# Patient Record
(demographics unavailable — no encounter records)

---

## 2024-10-10 NOTE — HEALTH RISK ASSESSMENT
[Good] : ~his/her~  mood as  good [No] : In the past 12 months have you used drugs other than those required for medical reasons? No [0] : 2) Feeling down, depressed, or hopeless: Not at all (0) [PHQ-2 Negative - No further assessment needed] : PHQ-2 Negative - No further assessment needed [Audit-CScore] : 0 [de-identified] : limited  [de-identified] : fruits limited [[id number]] : 0 [Former] : Former [10-14] : 10-14 [> 15 Years] : > 15 Years [Patient declined colonoscopy] : Patient declined colonoscopy [HIV test declined] : HIV test declined [Hepatitis C test declined] : Hepatitis C test declined [Change in mental status noted] : No change in mental status noted [Language] : denies difficulty with language [None] : None [With Significant Other] : lives with significant other [] :  [Feels Safe at Home] : Feels safe at home [Fully functional (bathing, dressing, toileting, transferring, walking, feeding)] : Fully functional (bathing, dressing, toileting, transferring, walking, feeding) [Fully functional (using the telephone, shopping, preparing meals, housekeeping, doing laundry, using] : Fully functional and needs no help or supervision to perform IADLs (using the telephone, shopping, preparing meals, housekeeping, doing laundry, using transportation, managing medications and managing finances) [Reports changes in hearing] : Reports no changes in hearing

## 2024-10-15 NOTE — PHYSICAL EXAM
[TextEntry] :  PLEASE SEE HPI FOR ADDITIONAL RELEVANT PHYSICAL EXAM FINDINGS GENERAL: no acute distress, nontoxic HEAD: normocephalic EYES: no scleral icterus NECK: trachea midline, Full ROM RESP: no respiratory distress ABD: nondistended MSK: no gross deformity NEURO: alert  SKIN: vesicular rash on right upper chest and upper back

## 2024-10-15 NOTE — PLAN
[FreeTextEntry1] :  Elderly m with liver ca on infusion therapy presents with shingles. I reviewed patients most recent labs which show a wbc count around 7.  He is afebrile, well appearing but c/o pain. He has oxycodone at home but did not use it.  I will prescribe valtrex and gabapentin and advise close f/u with his oncologist. Strict return precautions discussed

## 2024-10-15 NOTE — HISTORY OF PRESENT ILLNESS
[Home] : at home, [unfilled] , at the time of the visit. [Other Location: e.g. Home (Enter Location, City,State)___] : at [unfilled] [Verbal consent obtained from patient] : the patient, [unfilled] [FreeTextEntry8] :  Elderly m with liver ca on chemo present with right sided chest and upper back rash.  He states that it feels like someone is lighting a match on his chest.  He denies any trauma and calls in inquiring what to do.

## 2024-12-23 NOTE — REASON FOR VISIT
[Home] : at home, [unfilled] , at the time of the visit. [Medical Office: ([facility])___] : at the medical office located in  [Spouse] : spouse

## 2024-12-23 NOTE — REASON FOR VISIT
[Home] : at home, [unfilled] , at the time of the visit. [Medical Office: ([facility])___] : at the medical office located in  [Spouse] : spouse

## 2024-12-23 NOTE — REASON FOR VISIT
[Home] : at home, [unfilled] , at the time of the visit. [Medical Office: ([facility])___] : at the medical office located in  [Spouse] : spouse

## 2025-01-03 NOTE — HISTORY OF PRESENT ILLNESS
[de-identified] : Patient Name: CHRISTOPHER BURRELL  MRN: [id number]  Referring Provider: Dr. Bowen Date: 12/23/2024   Diagnosis: Liver mass  He presents for a follow up of a liver mass. 5/10/2019 CT chest: There are two 1.1 cm low-density liver lesions. They are probably cysts. 12/20/23-CT Chest: Since 5/10/2019, there is a new 5.4 cm liver mass, suspicious for malignancy. This could be a primary liver malignancy or metastasis. Recommend further evaluation with MRI of liver. 1/18/24- MR Abdomen: The hypoattenuating lesion noted on the recent chest CT is visualized as an approximate 4.2 x 5.7 x 4.0 cm lesion in the caudate lobe. The lesion is slightly hyperintense on T2-weighted images. It exhibits arterial phase enhancement with delayed phase washout. It exhibits restricted diffusion. It corresponds to a primary liver neoplasm, HCC. Based on the lesion size and its enhancement characteristics, this would be classified as a LiRAD 5 lesion, HCC. Smaller lesion adjacent to the above-described hepatic lesion is likely a complex cyst. 1/30/24- AFP: 55.6; Ca 19-9: 20; AST: 27; ALT: 31; ALK: 110 2/1/24- CTAP: 4.6 cm heterogeneous rounded mass in the posterior aspect of the left lobe of the liver suspicious for hepatocellular carcinoma. 1 cm cyst in the right lobe of liver anteriorly. 2/1/24 - CT AP showed a 4.6 cm mass in the posterior aspect of the left lobe of the liver suspicious for HCC. 3/13/24 - Y90 5/20/24 - MRI shows decreased tumor size, there is still viable residual tumor 8/21/24 - MRI shows persistent unchanged viable residual tumor at the posterior aspect of the caudate lobe lesion. Treated portions of the lesions have continued to decrease in size. No new suspicious lesions. 9/23/24 - Y90 12/12/24 - MRI shows post Y90 treatment of the lesion in segment one of liver. Lesion now shows improvement with central necrosis and decrease restricted diffusion. No central arterial hypervascularity. No other new lesions identified. 12/14/24 - AFP <1.0 (was 55.6 1/30/24)  Currently, Mr. BURRELL denies abdominal pain or discomfort, he is tolerating a regular diet and is drinking Ensure to supplement. He is having regular bowel movements.

## 2025-01-03 NOTE — ASSESSMENT
[FreeTextEntry1] : Impression) HCC status post Y90 treatment  Plan) reviewed current clinical information with the patient and his wife.  Has had an excellent response to the Y90 based on the fact that the tumor is smaller and the AFP has now normalized.  There is still seems to be areas of restricted diffusion but whether this is secondary to treatment or active disease I cannot tell at this time.  It still appears that the outflow is involved by the disease.  Will review at upcoming tumor board.  He has not received any systemic therapy and whether this is something that we should add to his treatment plan will be discussed.  Will follow-up after tumor board discussion.  All questions answered.  Masoud Perez MD  Chief Surgical Oncology Multidisciplinary GI cancer program [facility]  Professor Surgery [employer]   Time spent 25 minutes

## 2025-01-03 NOTE — ASSESSMENT
[FreeTextEntry1] : Impression) HCC status post Y90 treatment  Plan) reviewed current clinical information with the patient and his wife.  Has had an excellent response to the Y90 based on the fact that the tumor is smaller and the AFP has now normalized.  There is still seems to be areas of restricted diffusion but whether this is secondary to treatment or active disease I cannot tell at this time.  It still appears that the outflow is involved by the disease.  Will review at upcoming tumor board.  He has not received any systemic therapy and whether this is something that we should add to his treatment plan will be discussed.  Will follow-up after tumor board discussion.  All questions answered.  Masoud Perez MD  Chief Surgical Oncology Multidisciplinary GI cancer program [facility]  Professor Surgery [employer]   Time spent 25 minutes

## 2025-01-03 NOTE — ASSESSMENT
[FreeTextEntry1] : Impression) HCC status post Y90 treatment  Plan) reviewed current clinical information with the patient and his wife.  Has had an excellent response to the Y90 based on the fact that the tumor is smaller and the AFP has now normalized.  There is still seems to be areas of restricted diffusion but whether this is secondary to treatment or active disease I cannot tell at this time.  It still appears that the outflow is involved by the disease.  Will review at upcoming tumor board.  He has not received any systemic therapy and whether this is something that we should add to his treatment plan will be discussed.  Will follow-up after tumor board discussion.  All questions answered.  Masoud Perez MD  Chief Surgical Oncology Multidisciplinary GI cancer program [facility]  Professor Surgery [employer]   Time spent 25 minutes

## 2025-01-03 NOTE — HISTORY OF PRESENT ILLNESS
[de-identified] : Patient Name: CHRISTOPHER BURRELL  MRN: [id number]  Referring Provider: Dr. Bowen Date: 12/23/2024   Diagnosis: Liver mass  He presents for a follow up of a liver mass. 5/10/2019 CT chest: There are two 1.1 cm low-density liver lesions. They are probably cysts. 12/20/23-CT Chest: Since 5/10/2019, there is a new 5.4 cm liver mass, suspicious for malignancy. This could be a primary liver malignancy or metastasis. Recommend further evaluation with MRI of liver. 1/18/24- MR Abdomen: The hypoattenuating lesion noted on the recent chest CT is visualized as an approximate 4.2 x 5.7 x 4.0 cm lesion in the caudate lobe. The lesion is slightly hyperintense on T2-weighted images. It exhibits arterial phase enhancement with delayed phase washout. It exhibits restricted diffusion. It corresponds to a primary liver neoplasm, HCC. Based on the lesion size and its enhancement characteristics, this would be classified as a LiRAD 5 lesion, HCC. Smaller lesion adjacent to the above-described hepatic lesion is likely a complex cyst. 1/30/24- AFP: 55.6; Ca 19-9: 20; AST: 27; ALT: 31; ALK: 110 2/1/24- CTAP: 4.6 cm heterogeneous rounded mass in the posterior aspect of the left lobe of the liver suspicious for hepatocellular carcinoma. 1 cm cyst in the right lobe of liver anteriorly. 2/1/24 - CT AP showed a 4.6 cm mass in the posterior aspect of the left lobe of the liver suspicious for HCC. 3/13/24 - Y90 5/20/24 - MRI shows decreased tumor size, there is still viable residual tumor 8/21/24 - MRI shows persistent unchanged viable residual tumor at the posterior aspect of the caudate lobe lesion. Treated portions of the lesions have continued to decrease in size. No new suspicious lesions. 9/23/24 - Y90 12/12/24 - MRI shows post Y90 treatment of the lesion in segment one of liver. Lesion now shows improvement with central necrosis and decrease restricted diffusion. No central arterial hypervascularity. No other new lesions identified. 12/14/24 - AFP <1.0 (was 55.6 1/30/24)  Currently, Mr. BURRELL denies abdominal pain or discomfort, he is tolerating a regular diet and is drinking Ensure to supplement. He is having regular bowel movements.

## 2025-01-03 NOTE — HISTORY OF PRESENT ILLNESS
[de-identified] : Patient Name: CHRISTOPHER BURRELL  MRN: [id number]  Referring Provider: Dr. Bowen Date: 12/23/2024   Diagnosis: Liver mass  He presents for a follow up of a liver mass. 5/10/2019 CT chest: There are two 1.1 cm low-density liver lesions. They are probably cysts. 12/20/23-CT Chest: Since 5/10/2019, there is a new 5.4 cm liver mass, suspicious for malignancy. This could be a primary liver malignancy or metastasis. Recommend further evaluation with MRI of liver. 1/18/24- MR Abdomen: The hypoattenuating lesion noted on the recent chest CT is visualized as an approximate 4.2 x 5.7 x 4.0 cm lesion in the caudate lobe. The lesion is slightly hyperintense on T2-weighted images. It exhibits arterial phase enhancement with delayed phase washout. It exhibits restricted diffusion. It corresponds to a primary liver neoplasm, HCC. Based on the lesion size and its enhancement characteristics, this would be classified as a LiRAD 5 lesion, HCC. Smaller lesion adjacent to the above-described hepatic lesion is likely a complex cyst. 1/30/24- AFP: 55.6; Ca 19-9: 20; AST: 27; ALT: 31; ALK: 110 2/1/24- CTAP: 4.6 cm heterogeneous rounded mass in the posterior aspect of the left lobe of the liver suspicious for hepatocellular carcinoma. 1 cm cyst in the right lobe of liver anteriorly. 2/1/24 - CT AP showed a 4.6 cm mass in the posterior aspect of the left lobe of the liver suspicious for HCC. 3/13/24 - Y90 5/20/24 - MRI shows decreased tumor size, there is still viable residual tumor 8/21/24 - MRI shows persistent unchanged viable residual tumor at the posterior aspect of the caudate lobe lesion. Treated portions of the lesions have continued to decrease in size. No new suspicious lesions. 9/23/24 - Y90 12/12/24 - MRI shows post Y90 treatment of the lesion in segment one of liver. Lesion now shows improvement with central necrosis and decrease restricted diffusion. No central arterial hypervascularity. No other new lesions identified. 12/14/24 - AFP <1.0 (was 55.6 1/30/24)  Currently, Mr. BURRELL denies abdominal pain or discomfort, he is tolerating a regular diet and is drinking Ensure to supplement. He is having regular bowel movements.

## 2025-01-10 NOTE — DISCUSSION/SUMMARY
[FreeTextEntry1] : CAD holding ASA and cont eliquis echo reviewed.  Leg fatigue art us if able to approve and schedule. Edema venous us if able to approve and schedule. HLD reduce atorvastatin to 40 mg QD HTN reduce metoprolol 25 mg xl BID from 50 mg AF cont eliquis, ekg on follow up

## 2025-01-10 NOTE — REASON FOR VISIT
[Symptom and Test Evaluation] : symptom and test evaluation [FreeTextEntry1] : 74M with history of CAD, AF, HTN and HLD presents for a visit. Primary concern health wise has been HCC. No chest pain noted. Dyspnea has been stable. Occasional edema noted.  Notes occasional low blood pressure.  In addition, he has been having difficulty with large pills of 80 mg Lipitor.

## 2025-02-20 NOTE — PLAN
[FreeTextEntry1] : labs with liver specialist normal a1c, cmp, lipids, tsh, ca markers  bp stable  cont meds  follow up pending with cardio, pulm, onc

## 2025-02-20 NOTE — HISTORY OF PRESENT ILLNESS
[FreeTextEntry1] : follow up  [de-identified] : 73 yo m presents for follow up  here with labs from hepatology

## 2025-02-20 NOTE — HEALTH RISK ASSESSMENT
[0] : 2) Feeling down, depressed, or hopeless: Not at all (0) [PHQ-2 Negative - No further assessment needed] : PHQ-2 Negative - No further assessment needed [[id number]] : 0 [Never] : Never

## 2025-03-18 NOTE — REASON FOR VISIT
[Symptom and Test Evaluation] : symptom and test evaluation [FreeTextEntry1] : 74M with history of CAD, AF, HTN and HLD presents for a visit. Primary concern health wise has been HCC. No chest pain noted. Dyspnea has been stable. Occasional edema noted.  Notes occasional low blood pressure.  After a discussion with his wife, we reduced his metoprolol and lipitor;

## 2025-03-18 NOTE — DISCUSSION/SUMMARY
[FreeTextEntry1] : HTN - CHRISTOPHER  and I had an extensive discussion regarding his blood pressure management. Patient will continue taking current medications in addition to maintaining a low Na diet, with periodic b/p checks at home. HLD CHRISTOPHER and I discussed his lipid panel and individualized target LDL goal. At this point, will do diet and exercise with anticipation of re-evaluating labs in 3-6 months AF cont anticoagulation reduce metoprolol today Carotid disease we discussed intervention in the past but patient and wife do not want to pursue such strategy at this time.  EKG AF no ST T changes   [EKG obtained to assist in diagnosis and management of assessed problem(s)] : EKG obtained to assist in diagnosis and management of assessed problem(s)

## 2025-04-18 NOTE — PLAN
[FreeTextEntry1] : follow up labs ekg and recent cardio visit wnl  long discussion with patient and wife on goals of care  discussed case with cardio, hold eliquus for 4 days prior to procedure

## 2025-04-18 NOTE — ADDENDUM
[FreeTextEntry1] : cardio clearance in the chart  labs stable holding eliquis for 4 days prior to procedure patient medically optimized

## 2025-04-18 NOTE — HISTORY OF PRESENT ILLNESS
[Atrial Fibrillation] : atrial fibrillation [Coronary Artery Disease] : coronary artery disease [No Pertinent Pulmonary History] : no history of asthma, COPD, sleep apnea, or smoking [No Adverse Anesthesia Reaction] : no adverse anesthesia reaction in self or family member [Chronic Anticoagulation] : chronic anticoagulation [(Patient denies any chest pain, claudication, dyspnea on exertion, orthopnea, palpitations or syncope)] : Patient denies any chest pain, claudication, dyspnea on exertion, orthopnea, palpitations or syncope [Good (7-10 METs)] : Good (7-10 METs) [Aortic Stenosis] : no aortic stenosis [Chronic Kidney Disease] : no chronic kidney disease [Diabetes] : no diabetes [FreeTextEntry2] : 4/24/25 [FreeTextEntry1] : increased interval growth of liver cancer  [FreeTextEntry3] : GOVIND BONILLA [FreeTextEntry4] : 73 yo m presents s/p 3 month follow up mri of liver lesion  interval growth  for preop today for IR procedure  denies fevers, chills, cp, sob  active lifestyle-- no cp or sob  no trouble with prior anesthesia

## 2025-04-18 NOTE — HISTORY OF PRESENT ILLNESS
[Atrial Fibrillation] : atrial fibrillation [Coronary Artery Disease] : coronary artery disease [No Pertinent Pulmonary History] : no history of asthma, COPD, sleep apnea, or smoking [No Adverse Anesthesia Reaction] : no adverse anesthesia reaction in self or family member [Chronic Anticoagulation] : chronic anticoagulation [(Patient denies any chest pain, claudication, dyspnea on exertion, orthopnea, palpitations or syncope)] : Patient denies any chest pain, claudication, dyspnea on exertion, orthopnea, palpitations or syncope [Good (7-10 METs)] : Good (7-10 METs) [Aortic Stenosis] : no aortic stenosis [Chronic Kidney Disease] : no chronic kidney disease [Diabetes] : no diabetes [FreeTextEntry1] : increased interval growth of liver cancer  [FreeTextEntry2] : 4/24/25 [FreeTextEntry3] : GOVIND BONILLA [FreeTextEntry4] : 73 yo m presents s/p 3 month follow up mri of liver lesion  interval growth  for preop today for IR procedure  denies fevers, chills, cp, sob  active lifestyle-- no cp or sob  no trouble with prior anesthesia

## 2025-04-22 NOTE — ASSESSMENT
[FreeTextEntry1] : 74 year old man with PMH Atrial fibrillation, CAD, HLD who presents for initial consultation for HCC.   1. Hepatocellular Carcinoma - s/p 2 treatments with Y-90 in 3/2024 and 9/2024 - discussed most recent MRI scans. As per last tumor board discussion, while AFP levels are still undetectable, hepatic lesions felt to be slightly worse than prior scan and plan will be for Y-90 4/24/25 per IR discussion - we discussed that systemic therapies are typically reserved for unresectable/metastatic disease. If patient at a later time, is found to have worsening disease unamenable to local therapies, would consider those options at that time - also discussed need for chest imaging since last CT scan was in 12/2023 to rule out metastatic disease- will order noncontrast CT chest, though informed patient this does not need to prohibit/delay Y90 treatment   RTC Telehealth visit after CT scan Discussed with Dr. Tinajero  please see attending physician attestation below

## 2025-04-22 NOTE — END OF VISIT
[] : Fellow [FreeTextEntry3] :  I evaluated the patient with the Oncology fellow, Dr Kurtz.  The patient is here accompanied by his wife for evaluation of hepatocellular carcinoma.  Oncologic history: #  hepatocellular carcinoma --presentation:  incidental finding of liver mass on chest CT screening for lung cancer --MR abdomen 1/2024 showed a 4.2 x 5.7 x 4 cm LIRADS5 lesion in the liver.  AFP = 55 --Y-90 radioembolization in 3/2024 --repeat imaging showed decreased tumor size but viable tumor still present.  Repeat Y-90 in 9/2024.  Last imaging 3/28/25 reported as increase in size of the previously treated LR-TR viable HCC.  Increased from 1.4 cm to 1.7 cm.  No new lesions or extra-hepatic disease.  AFP remains normal.  Rest of history and exam per Dr Kurtz's note.  Plan: #  HCC --reviewed diagnosis, imaging studies w/ the patient and his wife. --imaging was reviewed in recent GI tumor board.  Slight increase in size of the area of concern in the liver.  However, AFP remains normal.  Options include repeat Y-90 now, or close observation w/ deferred treatment.  Tumor board favored repeating Y-90.  Pt has seen Dr Miner and is set up for later this week. --has been > 1 year since last chest CT scan.  recommend obtaining this imaging now, to r/o thoracic disease progression.  Unlikely in the setting of normal tumor marker but periodic chest imaging is warranted --maintain f/u with hepatologist Dr Richter. --case d/w Dr Perez on the date of the office visit.

## 2025-04-22 NOTE — PHYSICAL EXAM
[Normal] : grossly intact [Restricted in physically strenuous activity but ambulatory and able to carry out work of a light or sedentary nature] : Status 1- Restricted in physically strenuous activity but ambulatory and able to carry out work of a light or sedentary nature, e.g., light house work, office work

## 2025-04-22 NOTE — HISTORY OF PRESENT ILLNESS
[de-identified] : 74 year old man with PMH Atrial fibrillation, CAD, HLD who presents for initial consultation.   Oncologic History Found incidentally on CT imaging for lung cancer screening 12/20/23-CT Chest: Since 5/10/2019, there is a new 5.4 cm liver mass, suspicious for malignancy. This could be a primary liver malignancy or metastasis. Recommend further evaluation with MRI of liver. 1/18/24- MR Abdomen: The hypoattenuating lesion noted on the recent chest CT is visualized as an approximate 4.2 x 5.7 x 4.0 cm lesion in the caudate lobe. The lesion is slightly hyperintense on T2-weighted images. It exhibits arterial phase enhancement with delayed phase washout. It exhibits restricted diffusion. It corresponds to a primary liver neoplasm, HCC. Based on the lesion size and its enhancement characteristics, this would be classified as a LiRAD 5 lesion, HCC. Smaller lesion adjacent to the above-described hepatic lesion is likely a complex cyst. 1/30/24- AFP: 55.6; Ca 19-9: 20; AST: 27; ALT: 31; ALK: 110 2/1/24- CTAP: 4.6 cm heterogeneous rounded mass in the posterior aspect of the left lobe of the liver suspicious for hepatocellular carcinoma. 1 cm cyst in the right lobe of liver anteriorly. 2/1/24 - CT AP showed a 4.6 cm mass in the posterior aspect of the left lobe of the liver suspicious for HCC. 3/13/24 - Y90 5/20/24 - MRI shows decreased tumor size, there is still viable residual tumor 8/21/24 - MRI shows persistent unchanged viable residual tumor at the posterior aspect of the caudate lobe lesion. Treated portions of the lesions have continued to decrease in size. No new suspicious lesions. 9/23/24 - Y90 12/12/24 - MRI shows post Y90 treatment of the lesion in segment one of liver. Lesion now shows improvement with central necrosis and decrease restricted diffusion. No central arterial hypervascularity. No other new lesions identified. 12/14/24 - AFP <1.0 (was 55.6 1/30/24) 3/28/25: MRI  Since 12/12/2024, there has been an increase in the size of the previously treated LR-TR viable hepatocellular carcinoma.  PMH  Atrial fibrillation CAD HTN HLD 2023 admission after COVID infection, causing ambulatory dysfunctionary  FH Sister - tongue cancer Brother - intestinal/skin cancer Cousin - gallbladder cancer  SH smoking history - 80 pack year smoking, quit about 20 years ago  Denies current alcohol use - socially drinker Retired   Wife - OBGYN nurse [de-identified] : Feels generally well - normal appetite, denies abdominal pain, lower extremity edema, diarrhea

## 2025-05-28 NOTE — PHYSICAL EXAM
[de-identified] : Right elbow exam Splint removed, + abrasion over lateral elbow, no signs of infection, no purulent drainage + TTP olecranon ROM 50 to 90 Neurovascularly intact distally

## 2025-05-28 NOTE — PHYSICAL EXAM
[de-identified] : Right elbow exam Splint removed, + abrasion over lateral elbow, no signs of infection, no purulent drainage + TTP olecranon ROM 50 to 90 Neurovascularly intact distally

## 2025-05-28 NOTE — PROCEDURE
[de-identified] : Right elbow splint application Verbal consent received from patient Abrasion of lateral elbow dressed with xeroform and Telfa Webril dressing placed 4 in plaster posterior splint applied ACE bandage dressing 2+ capillary refill, full ROM of fingers, neurovascularly intact distally

## 2025-05-28 NOTE — PROCEDURE
[de-identified] : Right elbow splint application Verbal consent received from patient Abrasion of lateral elbow dressed with xeroform and Telfa Webril dressing placed 4 in plaster posterior splint applied ACE bandage dressing 2+ capillary refill, full ROM of fingers, neurovascularly intact distally

## 2025-05-28 NOTE — DISCUSSION/SUMMARY
[de-identified] : Right olecranon fracture after trip and fall last weekend. He would benefit from ORIF, risks/benefits/recovery period reviewed with him. He and wife are in agreement with plan. His original splint was removed as it was irritating his skin and new splint was applied in office today. Continue with sling. Plan for surgery later this week.

## 2025-05-28 NOTE — DISCUSSION/SUMMARY
[de-identified] : Right olecranon fracture after trip and fall last weekend. He would benefit from ORIF, risks/benefits/recovery period reviewed with him. He and wife are in agreement with plan. His original splint was removed as it was irritating his skin and new splint was applied in office today. Continue with sling. Plan for surgery later this week.

## 2025-05-28 NOTE — HISTORY OF PRESENT ILLNESS
[de-identified] : Kyle presents to the office today with right elbow pain after a fall last Friday. He reports a trip and fall, falling directly onto his right elbow. He was seen at the hospital where he was told he had an olecranon fracture and was placed into splint and sling.

## 2025-05-29 NOTE — HISTORY OF PRESENT ILLNESS
[de-identified] : 74 year old man with PMH Atrial fibrillation, CAD, HLD who presents for initial consultation.   Oncologic History Found incidentally on CT imaging for lung cancer screening 12/20/23-CT Chest: Since 5/10/2019, there is a new 5.4 cm liver mass, suspicious for malignancy. This could be a primary liver malignancy or metastasis. Recommend further evaluation with MRI of liver. 1/18/24- MR Abdomen: The hypoattenuating lesion noted on the recent chest CT is visualized as an approximate 4.2 x 5.7 x 4.0 cm lesion in the caudate lobe. The lesion is slightly hyperintense on T2-weighted images. It exhibits arterial phase enhancement with delayed phase washout. It exhibits restricted diffusion. It corresponds to a primary liver neoplasm, HCC. Based on the lesion size and its enhancement characteristics, this would be classified as a LiRAD 5 lesion, HCC. Smaller lesion adjacent to the above-described hepatic lesion is likely a complex cyst. 1/30/24- AFP: 55.6; Ca 19-9: 20; AST: 27; ALT: 31; ALK: 110 2/1/24- CTAP: 4.6 cm heterogeneous rounded mass in the posterior aspect of the left lobe of the liver suspicious for hepatocellular carcinoma. 1 cm cyst in the right lobe of liver anteriorly. 2/1/24 - CT AP showed a 4.6 cm mass in the posterior aspect of the left lobe of the liver suspicious for HCC. 3/13/24 - Y90 5/20/24 - MRI shows decreased tumor size, there is still viable residual tumor 8/21/24 - MRI shows persistent unchanged viable residual tumor at the posterior aspect of the caudate lobe lesion. Treated portions of the lesions have continued to decrease in size. No new suspicious lesions. 9/23/24 - Y90 12/12/24 - MRI shows post Y90 treatment of the lesion in segment one of liver. Lesion now shows improvement with central necrosis and decrease restricted diffusion. No central arterial hypervascularity. No other new lesions identified. 12/14/24 - AFP <1.0 (was 55.6 1/30/24) 3/28/25: MRI  Since 12/12/2024, there has been an increase in the size of the previously treated LR-TR viable hepatocellular carcinoma. -5/19/25 CT Chest 1. New 18 x 17 mm left lower lobe subpleural nodule. Please see above discussion. Further imaging to include whole body PET/CT, histological sampling and/or minimally short interval surveillance CT chest in 3 months. 2. Improving small and large airways inflammation characterized by bronchial wall thickening, tree-in-bud centrilobular micronodules and mucoid impaction most improved within the posterior basal segment of the left lower lobe. 3. Substantial paraseptal and mild centrilobular emphysema. 4. Poststernotomy and CABG with severe coronary artery calcification/stents. 5. Multiple low attenuation intrahepatic structures with limited evaluation on this noncontrast CT further characterization of the liver with a hepatic mass protocol this patient with known history of hepatocellular carcinoma.  PMH  Atrial fibrillation CAD HTN HLD 2023 admission after COVID infection, causing ambulatory dysfunctionary  FH Sister - tongue cancer Brother - intestinal/skin cancer Cousin - gallbladder cancer  SH smoking history - 80 pack year smoking, quit about 20 years ago  Denies current alcohol use - socially drinker Retired   Wife - OBGYN nurse [de-identified] : Feels generally well - normal appetite, denies abdominal pain, lower extremity edema, diarrhea

## 2025-06-03 NOTE — HISTORY OF PRESENT ILLNESS
[Clean/Dry/Intact] : clean, dry and intact [Swelling] : swollen [Neuro Intact] : an unremarkable neurological exam [Fair Pain Control] : has fair pain control [No Sign of Infection] : is showing no signs of infection [Chills] : no chills [Fever] : no fever [Discharge] : absent of discharge [Dehiscence] : not dehisced [de-identified] : Mr. Mac presents to the office for right olecranon fracture s/p ORIF, 5 days post op.  [de-identified] : He complains that the splint is very heavy and he has been having trouble sleeping over the weekend. Pain is not very well controlled. [de-identified] : Dressing was changed - new xeroform and Telfa  applied. Splint was reapplied. He will continue with sling. Tramadol for pain control will be sent to pharmacy. He will follow up next week for re-evaluation, post-op x-ray, transition to elbow hinged ROM brace.

## 2025-06-03 NOTE — HISTORY OF PRESENT ILLNESS
[Clean/Dry/Intact] : clean, dry and intact [Swelling] : swollen [Neuro Intact] : an unremarkable neurological exam [Fair Pain Control] : has fair pain control [No Sign of Infection] : is showing no signs of infection [Chills] : no chills [Fever] : no fever [Discharge] : absent of discharge [Dehiscence] : not dehisced [de-identified] : Mr. Mac presents to the office for right olecranon fracture s/p ORIF, 5 days post op.  [de-identified] : He complains that the splint is very heavy and he has been having trouble sleeping over the weekend. Pain is not very well controlled. [de-identified] : Dressing was changed - new xeroform and Telfa  applied. Splint was reapplied. He will continue with sling. Tramadol for pain control will be sent to pharmacy. He will follow up next week for re-evaluation, post-op x-ray, transition to elbow hinged ROM brace.

## 2025-07-07 NOTE — HISTORY OF PRESENT ILLNESS
[FreeTextEntry1] : hosp dc  [de-identified] : 73 yo m presents to discuss ED visit complicated by ICU hospitalization, was sent to rehab had a fall at night on the way to the bathroom, was admitted to the ICU with respiratory depression, later had a chest tube that removed a lot of fluid (thick mucous as well), was deconditioned after approx 2 week ICU stay and was sent to rehab  following with ortho for his recent elbow procedure

## 2025-07-07 NOTE — PLAN
[FreeTextEntry1] : a fib  was started on cardizem daily and on beta blocker  follow up pending with cardio tomorrow  hr regulated (patient wife presented with vitals)  bp  controlled  patient wife brought vitals from rehab  cont meds   cbc and cmp reviewed from 6/17 and 6/24 encouraged josé miguel lyle  interested in home health aide    no sob, no trouble breathing  recovered well once chest tube removed   follow up pending with cardio, heme, pulm

## 2025-07-10 NOTE — REASON FOR VISIT
[Symptom and Test Evaluation] : symptom and test evaluation [FreeTextEntry1] : 74M with history of CAD s/p PCI, HTN, HLD and venous insufficiency comes in for a re-evaluation. He was recently hospitalized for pneumonia, complicated by pleural effusion and empyema. AF was managed with inc CCB and BB.  We are reviewing labs.

## 2025-07-10 NOTE — DISCUSSION/SUMMARY
[FreeTextEntry1] : SOB/fatigue check echo if able to approve and schedule. AF cont anticoagulation HTN reduce CCB and cont metoprolol HLD CHRISTOPHER and I discussed his lipid panel and individualized target LDL goal. At this point, will do diet and exercise with anticipation of re-evaluating labs in 3-6 months Venous disease prn lasox EKG AF  [EKG obtained to assist in diagnosis and management of assessed problem(s)] : EKG obtained to assist in diagnosis and management of assessed problem(s)

## 2025-07-15 NOTE — HISTORY OF PRESENT ILLNESS
[de-identified] : 74 year old man with PMH Atrial fibrillation, CAD, HLD who presents for initial consultation.   Oncologic History Found incidentally on CT imaging for lung cancer screening 12/20/23-CT Chest: Since 5/10/2019, there is a new 5.4 cm liver mass, suspicious for malignancy. This could be a primary liver malignancy or metastasis. Recommend further evaluation with MRI of liver. 1/18/24- MR Abdomen: The hypoattenuating lesion noted on the recent chest CT is visualized as an approximate 4.2 x 5.7 x 4.0 cm lesion in the caudate lobe. The lesion is slightly hyperintense on T2-weighted images. It exhibits arterial phase enhancement with delayed phase washout. It exhibits restricted diffusion. It corresponds to a primary liver neoplasm, HCC. Based on the lesion size and its enhancement characteristics, this would be classified as a LiRAD 5 lesion, HCC. Smaller lesion adjacent to the above-described hepatic lesion is likely a complex cyst. 1/30/24- AFP: 55.6; Ca 19-9: 20; AST: 27; ALT: 31; ALK: 110 2/1/24- CTAP: 4.6 cm heterogeneous rounded mass in the posterior aspect of the left lobe of the liver suspicious for hepatocellular carcinoma. 1 cm cyst in the right lobe of liver anteriorly. 2/1/24 - CT AP showed a 4.6 cm mass in the posterior aspect of the left lobe of the liver suspicious for HCC. 3/13/24 - Y90 5/20/24 - MRI shows decreased tumor size, there is still viable residual tumor 8/21/24 - MRI shows persistent unchanged viable residual tumor at the posterior aspect of the caudate lobe lesion. Treated portions of the lesions have continued to decrease in size. No new suspicious lesions. 9/23/24 - Y90 12/12/24 - MRI shows post Y90 treatment of the lesion in segment one of liver. Lesion now shows improvement with central necrosis and decrease restricted diffusion. No central arterial hypervascularity. No other new lesions identified. 12/14/24 - AFP <1.0 (was 55.6 1/30/24) 3/28/25: MRI  Since 12/12/2024, there has been an increase in the size of the previously treated LR-TR viable hepatocellular carcinoma. CT Chest 5/19/25:  1. New 18 x 17 mm left lower lobe subpleural nodule. Please see above discussion. Further imaging to include whole body PET/CT, histological sampling and/or minimally short interval surveillance CT chest in 3 months. 2. Improving small and large airways inflammation characterized by bronchial wall thickening, tree-in-bud centrilobular micronodules and mucoid impaction most improved within the posterior basal segment of the left lower lobe. 3. Substantial paraseptal and mild centrilobular emphysema. 4. Poststernotomy and CABG with severe coronary artery calcification/stents. 5. Multiple low attenuation intrahepatic structures with limited evaluation on this noncontrast CT further characterization of the liver with a hepatic mass protocol this patient with known history of hepatocellular carcinoma. MRI Abdomen 5/28/25: Lesion 1: No nodular enhancement as noted previously. LI-RAD: LR-TR nonviable  PMH  Atrial fibrillation CAD HTN HLD 2023 admission after COVID infection, causing ambulatory dysfunctionary  FH Sister - tongue cancer Brother - intestinal/skin cancer Cousin - gallbladder cancer  SH smoking history - 80 pack year smoking, quit about 20 years ago  Denies current alcohol use - socially drinker Retired   Wife - OBGYN nurse [de-identified] : Feels generally well - normal appetite, denies abdominal pain, lower extremity edema, diarrhea

## 2025-07-22 NOTE — PHYSICAL EXAM
[de-identified] : Right elbow exam Incision healed without signs of infection, open abrasion to elbow without signs of infection Elbow ROM 5 to 140 5-/5  strength, weaker compared to contralateral side Neurovascular intact distally  [de-identified] : Xray right elbow Appropriate placement of hardware, ulnar fracture healed

## 2025-07-22 NOTE — HISTORY OF PRESENT ILLNESS
[de-identified] : Kyle follows up for his right elbow today. He reports he is doing very well in terms of the elbow, he is still working with visiting physical therapist.

## 2025-07-22 NOTE — DISCUSSION/SUMMARY
[de-identified] : Right ulnar fracture s/p ORIF, healed. He is doing very well. He should continue with physical therapy to work on strengthening/ strength. Follow up early Sept.